# Patient Record
Sex: MALE | Employment: FULL TIME | URBAN - METROPOLITAN AREA
[De-identification: names, ages, dates, MRNs, and addresses within clinical notes are randomized per-mention and may not be internally consistent; named-entity substitution may affect disease eponyms.]

---

## 2019-07-29 ENCOUNTER — HOSPITAL ENCOUNTER (EMERGENCY)
Facility: CLINIC | Age: 34
Discharge: HOME | End: 2019-07-30
Attending: EMERGENCY MEDICINE | Admitting: EMERGENCY MEDICINE
Payer: COMMERCIAL

## 2019-07-29 VITALS
TEMPERATURE: 97.9 F | HEIGHT: 67 IN | OXYGEN SATURATION: 99 % | WEIGHT: 162 LBS | DIASTOLIC BLOOD PRESSURE: 101 MMHG | BODY MASS INDEX: 25.43 KG/M2 | RESPIRATION RATE: 16 BRPM | SYSTOLIC BLOOD PRESSURE: 156 MMHG

## 2019-07-29 DIAGNOSIS — S61.211A LACERATION OF LEFT INDEX FINGER WITHOUT FOREIGN BODY WITHOUT DAMAGE TO NAIL, INITIAL ENCOUNTER: ICD-10-CM

## 2019-07-29 PROCEDURE — 12041 INTMD RPR N-HF/GENIT 2.5CM/<: CPT

## 2019-07-29 PROCEDURE — 99283 EMERGENCY DEPT VISIT LOW MDM: CPT | Mod: 25

## 2019-07-29 ASSESSMENT — ENCOUNTER SYMPTOMS: WOUND: 1

## 2019-07-29 ASSESSMENT — MIFFLIN-ST. JEOR: SCORE: 1633.46

## 2019-07-29 NOTE — ED AVS SNAPSHOT
Emergency Department  [address] [place] [address]  Phone:  [phone]  Fax:  [phone]                                    Christiano Marin   MRN: [id number]    Department:   Emergency Department   Date of Visit:  7/29/2019           After Visit Summary Signature Page    I have received my discharge instructions, and my questions have been answered. I have discussed any challenges I see with this plan with the nurse or doctor.    ..........................................................................................................................................  Patient/Patient Representative Signature      ..........................................................................................................................................  Patient Representative Print Name and Relationship to Patient    ..................................................               ................................................  Date                                   Time    ..........................................................................................................................................  Reviewed by Signature/Title    ...................................................              ..............................................  Date                                               Time          22EPIC Rev 08/18

## 2019-07-30 NOTE — ED PROVIDER NOTES
"  History     Chief Complaint:  Laceration    HPI   Christiano Marin is a 33 year old male who presents to the emergency department today for evaluation of a laceration. The patient reports he was cutting dry wall when he cut his left index finger. The patient denies other injury.  The patient's last tetanus immunization was in 2016 per the [facility].     Allergies:  No Known Allergies     Medications:    Debrox     Past Medical History:    History reviewed. No pertinent past medical history.    Past Surgical History:    Hernia repair     Family History:    History reviewed. No pertinent family history.      Social History:  The patient was accompanied to the ED by his wife.  Smoking Status: Current Every Day Smoker  Smokeless Tobacco: Never Used  Alcohol Use: Positive    Drug use: Negative       Review of Systems   Skin: Positive for wound.   All other systems reviewed and are negative.        Physical Exam     Patient Vitals for the past 24 hrs:   BP Temp Temp src Heart Rate Resp SpO2 Height Weight   07/29/19 2204 (!) 156/101 97.9  F (36.6  C) Oral 78 16 99 % 1.702 m (5' 7\") 73.5 kg (162 lb)      Physical Exam  General: Appears well-developed and well-nourished.   Head: No signs of trauma.   CV: Normal rate and regular rhythm.    Resp: Effort normal and breath sounds normal. No respiratory distress.   MSK: Normal range of motion.   Neuro: The patient is alert and oriented.  Speech normal.  GCS 15  Skin: Skin is warm and dry. 2.5 cm laceration on extensor surface of left index finger.  Extensor tendon visible, but no apparent injury through ROM.  +neurovasculary intact distally.  Psych: normal mood and affect. behavior is normal.       Emergency Department Course     Procedures:    Laceration Repair        LACERATION:  A 2.5 cm laceration.      LOCATION:  Proximal left 2nd digit phalanx       FUNCTION:  Distally sensation, circulation, motor and tendon function are intact.      ANESTHESIA:  Digital block using 0.5 " bupivacaine total of 5 mLs      PREPARATION:  Irrigation and Scrubbing with Normal Saline and Shur Clens      DEBRIDEMENT:  wound explored, no foreign body found      CLOSURE:  Wound was closed with Two Layers.  Deep tissue closed with 3 x 5-0 vicryl sutures.  Skin closed with 6 x 4.0 Vicryl Rapide Sutures using interrupted sutures.       Emergency Department Course:    2220 Nursing notes and vitals reviewed.    2225 I performed an exam of the patient as documented above.     2344 I personally answered all questions prior to discharge    Impression & Plan      Medical Decision Making:  Christiano Marin is a 33 year old male who presents to the emergency department today for evaluation of left index finger.  He was using a utility blade to cut sheet rock when he actually cut his finger.  My evaluation, there was a 2.5 centimeter laceration over the proximal phalanx of the left index finger.  Patient was neurovascularly intact distally and had appropriate strength of both flexion and extension.  I was able to thoroughly explore the wound.  I could identify the extensor tendon, but there did not appear to be any injury to it through the full range of motion.  Was thoroughly cleaned and sutured closed with good closure.  Patient was placed in a splint to help protect the suture I discussed doing gentle range of motion prevent stiffness.  She was recommended follow-up with orthopedic hand specialty.  I did use absorbable sutures and the patient was instructed to monitor for any signs of infection.    Diagnosis:    ICD-10-CM    1. Laceration of left index finger without foreign body without damage to nail, initial encounter S61.211A      Disposition:   The patient is discharged to home.     Discharge Medications:  No discharge medications     Scribe Disclosure:  I, Yuni Emery, am serving as a scribe at 10:26 PM on 7/29/2019 to document services personally performed by Kurt Rome MD based on my observations  and the provider's statements to me.         EMERGENCY DEPARTMENT       Kurt Rome MD  08/01/19 0702